# Patient Record
(demographics unavailable — no encounter records)

---

## 2025-02-24 NOTE — HISTORY OF PRESENT ILLNESS
[FreeTextEntry1] : 74F presents for post op evaluation   h/o TERESA, ISD  s/p MUS (6/14/23)  TERESA resolved PVR: 0  9/25/23 Reporting nightly incontinence episodes Associated with urgency  Suffers from CECI, managed with leg braces and compression stockings  Trial of lasix 20, doesn't remember taking   2/24/25 suffered 4 CVA since last visit Worsening nocturnal symptoms confirms LLE  DTF: 4 NTF: 1->4 PPD: 1, for security -> 2

## 2025-02-24 NOTE — PHYSICAL EXAM
[FreeTextEntry1] : incision healing well, no mesh exposure, sutures in place  [General Appearance - Well Developed] : well developed [General Appearance - Well Nourished] : well nourished [Abdomen Soft] : soft [Abdomen Tenderness] : non-tender [de-identified] : 1+ pitting edema bilaterally

## 2025-05-22 NOTE — RESULTS/DATA
[TextEntry] : Labs on 02/27/2025 H/H 11.4/36.4, Iron 21, TIBC 248, Ferritin 16 with iron saturation of 8%.

## 2025-05-22 NOTE — ASSESSMENT
[FreeTextEntry1] : The patient is a 76-year-old female accompanied by  with past medical history significant for left breast cancer in 2000, underwent mastectomy/lumpectomy F/U by CMT and the RT subsequently placed on Tamoxifen for 6 years, CVA x3, HLD and hypertension, Vit b-12 deficiency and gastric bypass on 12/01/2012 who was referred to the office by Dr. Webster for initial evaluation for Anemia.  Iron deficiency Anemia:  S/p gastric bypass and was started on Eliquis. JOSE L negative  Check TIBC, ferritin and stool for OB Avoid NSAIDs  GERD: She was advised to avoid late-night meals and dietary indiscretions. She was advised to avoid fried and fatty foods. She was advised to abide by an anti-GERD diet. She was given a pamphlet for anti-GERD. Reviewed the anti-GERD diet at length. Continue pantoprazole and add famotidine.   Follow up in 1month

## 2025-05-22 NOTE — PHYSICAL EXAM
[Alert] : alert [Normal Voice/Communication] : normal voice/communication [Healthy Appearing] : healthy appearing [No Acute Distress] : no acute distress [Sclera] : the sclera and conjunctiva were normal [Hearing Threshold Finger Rub Not [place]] : hearing was normal [Normal Lips/Gums] : the lips and gums were normal [Oropharynx] : the oropharynx was normal [Normal Appearance] : the appearance of the neck was normal [No Neck Mass] : no neck mass was observed [No Respiratory Distress] : no respiratory distress [No Acc Muscle Use] : no accessory muscle use [Respiration, Rhythm And Depth] : normal respiratory rhythm and effort [Auscultation Breath Sounds / Voice Sounds] : lungs were clear to auscultation bilaterally [Heart Rate And Rhythm] : heart rate was normal and rhythm regular [Normal S1, S2] : normal S1 and S2 [Murmurs] : no murmurs [Bowel Sounds] : normal bowel sounds [Abdomen Tenderness] : non-tender [No Masses] : no abdominal mass palpated [Abdomen Soft] : soft [] : no hepatosplenomegaly [External Hemorrhoid] : an external hemorrhoid was present [Oriented To Time, Place, And Person] : oriented to person, place, and time

## 2025-05-22 NOTE — HISTORY OF PRESENT ILLNESS
[FreeTextEntry1] : The patient is a 76-year-old female accompanied by  with past medical history significant for left breast cancer in 2000, underwent mastectomy/lumpectomy F/U by CMT and the RT subsequently placed on Tamoxifen for 6 years, CVA x3, HLD and hypertension, vit b-12 deficiency and gastric bypass on 12/01/2012 who was referred to the office by Dr. Webster for initial evaluation for Anemia.  She was started on Eliquis on 07/01/2024 after the last stroke.  She is followed by [place] [facility] in [place].  She complains of gastroesophageal reflux disease symptoms, on pantoprazole with some relief. She denies dysphagia and odynophagia. Occasional nausea and vomiting when she overeats.  She denies diarrhea and constipation. She has 2 to 3 bowel movements a day. She denies a change in bowel habits and a change in caliber of stool. She denies mucus discharge or blood with the bowel movements. She denies melena or hematemesis.  She denies any rectal pain or rectal pruritus. She lost 10 pounds in 3 months. She admits to having a prior endoscopy in 2012 and colonoscopy by Dr. Jabier Cantor 3 years ago.   She denies any significant family history of GI problems or GI cancer. She denies family history of colon cancer.

## 2025-06-26 NOTE — DATA REVIEWED
[de-identified] :      ACC: [id number]     EXAM:  MR BRAIN   ORDERED BY: ALMACOSTA WELLERSEYMOUR  PROCEDURE DATE:  07/02/2024    INTERPRETATION:  Clinical indication: CVA  MRI of brain was performed using sagittal T1 axial T1 and T2 T2 FLAIR diffusion and susceptibility sequence.  This exam is compared prior head CT performed on July 1, 2024  Parenchymal volume loss and chronic microvessel ischemic changes are identified  Areas of abnormal T2 prolongation are identified throughout the right cerebellar hemisphere with involvement of the medial left occipital cortex as well as medial to the atrium of the left lateral ventricle. These findings are compatible areas of acute infarcts. No hemorrhagic transformation is seen. No significant shift or herniation is  There is evidence of small vague focus of increased signal seen involving the left thalamus on the diffusion weighted sequence as well as T2 FLAIR sequence. This could be compatible with a subacute lacunar infarct. There is evidence of abnormal susceptibility seen involving the right posterior frontal and right parietal region which is likely compatible with old subarachnoid hemorrhage.  Encephalization and gliosis involving the right parietal region is identified  Old infarct as well.  No significant shift or herniation seen.  Large vessels demonstrate normal flow voids  IMPRESSION: Acute infarcts as described above.  --- End of Report ---      AUSTIN CARRANZA MD; Attending Radiologist This document has been electronically signed. Jul 2 2024 11:05AM   ACC: [id number]     EXAM:  MR BRAIN   ORDERED BY: ALMACOSTA WESTBROOK  PROCEDURE DATE:  07/02/2024    INTERPRETATION:  Clinical indication: CVA  MRI of brain was performed using sagittal T1 axial T1 and T2 T2 FLAIR diffusion and susceptibility sequence.  This exam is compared prior head CT performed on July 1, 2024  Parenchymal volume loss and chronic microvessel ischemic changes are identified  Areas of abnormal T2 prolongation are identified throughout the right cerebellar hemisphere with involvement of the medial left occipital cortex as well as medial to the atrium of the left lateral ventricle. These findings are compatible areas of acute infarcts. No hemorrhagic transformation is seen. No significant shift or herniation is  There is evidence of small vague focus of increased signal seen involving the left thalamus on the diffusion weighted sequence as well as T2 FLAIR sequence. This could be compatible with a subacute lacunar infarct. There is evidence of abnormal susceptibility seen involving the right posterior frontal and right parietal region which is likely compatible with old subarachnoid hemorrhage.  Encephalization and gliosis involving the right parietal region is identified  Old infarct as well.  No significant shift or herniation seen.  Large vessels demonstrate normal flow voids  IMPRESSION: Acute infarcts as described above.  --- End of Report ---      AUSTIN CARRANZA MD; Attending Radiologist This document has been electronically signed. Jul 2 2024 11:05AM   [de-identified] : CTA head and neck: 1.  Occlusive clot in the right MCA. 2.  Occlusive left V4 vertebral segment clot, with moderate stenosis in the right V4 vertebral segment. 3.  High-grade stenosis vs occlusion in the left V1 vertebral segment. 4.  Subacute infarcts in the right cerebellum and left occipital lobe. 5.  No acute intracranial hemorrhage. 6.  MRI would be more sensitive for acute ischemia.

## 2025-06-26 NOTE — ASSESSMENT
[FreeTextEntry1] : The patient is a 76-year-old female accompanied by  with past medical history significant for left breast cancer in 2000, underwent mastectomy/lumpectomy F/U by CMT and the RT subsequently placed on Tamoxifen for 6 years, CVA x3, HLD and hypertension, vit b-12 deficiency and gastric bypass on 12/01/2012 who was seen for follow up for Anemia. Labs on 05/22/2025 showed H/H 11.9/39.4, serum Iron level 22ug/dl and iron saturation of 9%.   Colonoscopy and Endoscopy for anemia, CRC and GERD -Obtain colonoscopy and endoscopy records from Dr. Cantor office -Need clearance from Cardiology prior to procedure (Dr. Ashleigh Batres) -Need clearance from Neurology about holding Eliquis before the procedure ([facility] in [place]) -MiraLax bowel prep  GERD:  -EGD for GERD and anemia -Continue pantoprazole and add famotidine.

## 2025-06-26 NOTE — DATA REVIEWED
[de-identified] :      ACC: [id number]     EXAM:  MR BRAIN   ORDERED BY: ALMACOSTA WELLERSEYMOUR  PROCEDURE DATE:  07/02/2024    INTERPRETATION:  Clinical indication: CVA  MRI of brain was performed using sagittal T1 axial T1 and T2 T2 FLAIR diffusion and susceptibility sequence.  This exam is compared prior head CT performed on July 1, 2024  Parenchymal volume loss and chronic microvessel ischemic changes are identified  Areas of abnormal T2 prolongation are identified throughout the right cerebellar hemisphere with involvement of the medial left occipital cortex as well as medial to the atrium of the left lateral ventricle. These findings are compatible areas of acute infarcts. No hemorrhagic transformation is seen. No significant shift or herniation is  There is evidence of small vague focus of increased signal seen involving the left thalamus on the diffusion weighted sequence as well as T2 FLAIR sequence. This could be compatible with a subacute lacunar infarct. There is evidence of abnormal susceptibility seen involving the right posterior frontal and right parietal region which is likely compatible with old subarachnoid hemorrhage.  Encephalization and gliosis involving the right parietal region is identified  Old infarct as well.  No significant shift or herniation seen.  Large vessels demonstrate normal flow voids  IMPRESSION: Acute infarcts as described above.  --- End of Report ---      AUSTIN CARRANZA MD; Attending Radiologist This document has been electronically signed. Jul 2 2024 11:05AM   ACC: [id number]     EXAM:  MR BRAIN   ORDERED BY: ALMACOSTA WESTBROOK  PROCEDURE DATE:  07/02/2024    INTERPRETATION:  Clinical indication: CVA  MRI of brain was performed using sagittal T1 axial T1 and T2 T2 FLAIR diffusion and susceptibility sequence.  This exam is compared prior head CT performed on July 1, 2024  Parenchymal volume loss and chronic microvessel ischemic changes are identified  Areas of abnormal T2 prolongation are identified throughout the right cerebellar hemisphere with involvement of the medial left occipital cortex as well as medial to the atrium of the left lateral ventricle. These findings are compatible areas of acute infarcts. No hemorrhagic transformation is seen. No significant shift or herniation is  There is evidence of small vague focus of increased signal seen involving the left thalamus on the diffusion weighted sequence as well as T2 FLAIR sequence. This could be compatible with a subacute lacunar infarct. There is evidence of abnormal susceptibility seen involving the right posterior frontal and right parietal region which is likely compatible with old subarachnoid hemorrhage.  Encephalization and gliosis involving the right parietal region is identified  Old infarct as well.  No significant shift or herniation seen.  Large vessels demonstrate normal flow voids  IMPRESSION: Acute infarcts as described above.  --- End of Report ---      AUSTIN CARRANZA MD; Attending Radiologist This document has been electronically signed. Jul 2 2024 11:05AM   [de-identified] : CTA head and neck: 1.  Occlusive clot in the right MCA. 2.  Occlusive left V4 vertebral segment clot, with moderate stenosis in the right V4 vertebral segment. 3.  High-grade stenosis vs occlusion in the left V1 vertebral segment. 4.  Subacute infarcts in the right cerebellum and left occipital lobe. 5.  No acute intracranial hemorrhage. 6.  MRI would be more sensitive for acute ischemia.

## 2025-06-26 NOTE — HISTORY OF PRESENT ILLNESS
[FreeTextEntry1] : Patient has history of atrial fibrillation and is here to establish care.  She has had several strokes in 2024 and subsequently she has had difficulty with her memory.  She has COVID.  She ambulates using a rollator/walker.

## 2025-06-26 NOTE — CONSULT LETTER
[Dear  ___] : Dear  [unfilled], [Consult Letter:] : I had the pleasure of evaluating your patient, [unfilled]. [Please see my note below.] : Please see my note below. [Consult Closing:] : Thank you very much for allowing me to participate in the care of this patient.  If you have any questions, please do not hesitate to contact me. [Sincerely,] : Sincerely, [FreeTextEntry3] : Ami Ward MD, MPH

## 2025-06-26 NOTE — HISTORY OF PRESENT ILLNESS
[FreeTextEntry1] : The patient is a 76-year-old female accompanied by  with past medical history significant for left breast cancer in 2000, underwent mastectomy/lumpectomy F/U by CMT and the RT subsequently placed on Tamoxifen for 6 years, CVA x3, HLD and hypertension, Vit b-12 deficiency and gastric bypass on 12/01/2012 who was seen for follow up for Anemia. She was started on Eliquis on 07/01/2024 after the last stroke. She is followed by [place] [facility] in [place]. She complains of gastroesophageal reflux disease symptoms, on pantoprazole with some relief. She denies dysphagia and odynophagia. Occasional nausea and vomiting when she overeats. She denies diarrhea and constipation. She has 2 to 3 bowel movements a day. She denies a change in bowel habits and a change in caliber of stool. She denies mucus discharge or blood with the bowel movements. She denies melena or hematemesis. She denies any rectal pain or rectal pruritus. She lost 10 pounds in 3 months. She admits to having a prior endoscopy in 2012 and colonoscopy by Dr. Jabier Cantor 3 years ago. She denies any significant family history of GI problems or GI cancer. She denies family history of colon cancer.  Labs on 05/22/2025 showed H/H 11.9/39.4, serum Iron level 22ug/dl and iron saturation of 9%.

## 2025-06-26 NOTE — ASSESSMENT
[FreeTextEntry1] : right cerebellar hemisphere left occipital eloina left thalamus  right posterior frontal and right parietal region which is likely compatible with old subarachnoid hemorrhage.  CTA head and neck: 1.  Occlusive clot in the right MCA. 2.  Occlusive left V4 vertebral segment clot, with moderate stenosis in the right V4 vertebral segment. 3.  High-grade stenosis vs occlusion in the left V1 vertebral segment. 4.  Subacute infarcts in the right cerebellum and left occipital lobe. 5.  No acute intracranial hemorrhage. 6.  MRI would be more sensitive for acute ischemia.  Will check LDL/hba1c to eval for LDL goal cw Eliquis; will increase Lipitor from 20 to 80mg HS due to possible high grade stenosis BP goal of normal  Memory problems, likely due to vascular dementia  I spent the time noted on the day of this patient encounter preparing for, providing and documenting the above E/M service and counseling and educate patient on differential, workup, disease course, and treatment/management. Education was provided to the patient during this encounter. All questions and concerns were answered and addressed in detail. The patient verbalized understanding and agreed to plan. Patient was advised to continue to monitor for neurologic symptoms and to notify my office or go to the nearest emergency room if there are any changes. Any orders/referrals and communications were provided as well.   Side effects of the above medications were discussed in detail including but not limited to applicable black box warning and teratogenicity as appropriate.  For patients with seizures, I discussed risk of SUDEP with patient and advised that SUDEP risk can be minimized with good seizure control through medication compliance and other measures. Patient was advised to bring previous records to my office, including CD of imaging, when applicable.

## 2025-06-26 NOTE — PHYSICAL EXAM
[General Appearance - Alert] : alert [General Appearance - In No Acute Distress] : in no acute distress [Person] : oriented to person [Place] : oriented to place [Concentration Intact] : normal concentrating ability [Naming Objects] : no difficulty naming common objects [Fluency] : fluency intact [Comprehension] : comprehension intact [Vocabulary] : adequate range of vocabulary [Cranial Nerves Optic (II)] : visual acuity intact bilaterally,  visual fields full to confrontation, pupils equal round and reactive to light [Cranial Nerves Oculomotor (III)] : extraocular motion intact [Cranial Nerves Trigeminal (V)] : facial sensation intact symmetrically [Cranial Nerves Facial (VII)] : face symmetrical [Cranial Nerves Vestibulocochlear (VIII)] : hearing was intact bilaterally [Cranial Nerves Glossopharyngeal (IX)] : tongue and palate midline [Cranial Nerves Accessory (XI - Cranial And Spinal)] : head turning and shoulder shrug symmetric [Cranial Nerves Hypoglossal (XII)] : there was no tongue deviation with protrusion [Motor Tone] : muscle tone was normal in all four extremities [Motor Strength] : muscle strength was normal in all four extremities [Involuntary Movements] : no involuntary movements were seen [Time] : disoriented to time [Coordination - Dysmetria Impaired Finger-to-Nose Bilateral] : not present [Coordination - Dysmetria Impaired Heel-to-Shin Bilateral] : not present [FreeTextEntry7] : sensory loss to the left side to LT [FreeTextEntry8] : Antalgic gait. uses roller

## 2025-07-03 NOTE — HISTORY OF PRESENT ILLNESS
[FreeTextEntry1] : rash on hands, white lesions on tongue [de-identified] : hand dermatitis with hypopigmentation couple small white patches on the tongue

## 2025-07-03 NOTE — ASSESSMENT
[FreeTextEntry1] : -- hand dermatitis with hypopigmentation couple small white patches on the tongue  #Hand dermatitis -chronic with flare -gentle care measures -start tacrolimus ointment BID (has hypopigmented presentation - avoid TCS)  #Leukoplakia -disc with pt uncertain etiology for 2 small patches on the tongue today.  -trial of fluc for thrush? Pt already doing oral rinses as rec by med dr -rec ENT eval if persistent - referral provided

## 2025-07-03 NOTE — PHYSICAL EXAM
[FreeTextEntry3] :  Gen:                        Well appearing, well nourished, NAD. Skin:                       Eccrine:                 Within normal limits   Mouth:                   UE:                       Neuro:                     No focal deficits. Age appropriate. Psych:                     Appropriate affect.